# Patient Record
Sex: MALE | Race: WHITE | NOT HISPANIC OR LATINO | Employment: FULL TIME | URBAN - NONMETROPOLITAN AREA
[De-identification: names, ages, dates, MRNs, and addresses within clinical notes are randomized per-mention and may not be internally consistent; named-entity substitution may affect disease eponyms.]

---

## 2022-02-03 ENCOUNTER — OFFICE VISIT (OUTPATIENT)
Dept: CARDIOLOGY | Facility: CLINIC | Age: 62
End: 2022-02-03

## 2022-02-03 VITALS
BODY MASS INDEX: 41.88 KG/M2 | HEART RATE: 55 BPM | OXYGEN SATURATION: 99 % | HEIGHT: 72 IN | WEIGHT: 309.2 LBS | SYSTOLIC BLOOD PRESSURE: 140 MMHG | DIASTOLIC BLOOD PRESSURE: 69 MMHG

## 2022-02-03 DIAGNOSIS — I10 PRIMARY HYPERTENSION: ICD-10-CM

## 2022-02-03 DIAGNOSIS — R60.0 BILATERAL LEG EDEMA: ICD-10-CM

## 2022-02-03 DIAGNOSIS — R06.02 SOB (SHORTNESS OF BREATH): Primary | ICD-10-CM

## 2022-02-03 DIAGNOSIS — R53.82 CHRONIC FATIGUE: ICD-10-CM

## 2022-02-03 PROCEDURE — 93000 ELECTROCARDIOGRAM COMPLETE: CPT | Performed by: NURSE PRACTITIONER

## 2022-02-03 PROCEDURE — 99204 OFFICE O/P NEW MOD 45 MIN: CPT | Performed by: NURSE PRACTITIONER

## 2022-02-03 RX ORDER — LOPERAMIDE HYDROCHLORIDE 2 MG/1
2 CAPSULE ORAL AS NEEDED
COMMUNITY
End: 2022-03-24

## 2022-02-03 RX ORDER — ATENOLOL 50 MG/1
50 TABLET ORAL EVERY EVENING
COMMUNITY
Start: 2022-01-31

## 2022-02-03 RX ORDER — TORSEMIDE 20 MG/1
20 TABLET ORAL DAILY
Qty: 90 TABLET | Refills: 1 | Status: SHIPPED | OUTPATIENT
Start: 2022-02-03 | End: 2022-08-12 | Stop reason: SDUPTHER

## 2022-02-03 RX ORDER — BUMETANIDE 1 MG/1
1 TABLET ORAL 2 TIMES DAILY
COMMUNITY
Start: 2021-11-09 | End: 2022-02-03

## 2022-02-03 NOTE — PROGRESS NOTES
Subjective     Arley Wells is a 62 y.o. male who presents to day for Establish Care (presents for cardiac eval), Shortness of Breath, Edema, and Hypertension.    CHIEF COMPLIANT  Chief Complaint   Patient presents with   • Establish Care     presents for cardiac eval   • Shortness of Breath   • Edema   • Hypertension       Active Problems:  Problem List Items Addressed This Visit     None      Visit Diagnoses     SOB (shortness of breath)    -  Primary    Relevant Orders    Stress Test With Myocardial Perfusion One Day    Adult Transthoracic Echo Complete W/ Cont if Necessary Per Protocol    Doppler Ankle Brachial Index Single Level CAR    Bilateral leg edema        Relevant Medications    torsemide (Demadex) 20 MG tablet    Other Relevant Orders    Stress Test With Myocardial Perfusion One Day    Adult Transthoracic Echo Complete W/ Cont if Necessary Per Protocol    Doppler Ankle Brachial Index Single Level CAR    Chronic fatigue        Relevant Orders    Stress Test With Myocardial Perfusion One Day    Adult Transthoracic Echo Complete W/ Cont if Necessary Per Protocol    Doppler Ankle Brachial Index Single Level CAR    Primary hypertension        Relevant Medications    atenolol (TENORMIN) 50 MG tablet    torsemide (Demadex) 20 MG tablet    Other Relevant Orders    Stress Test With Myocardial Perfusion One Day    Adult Transthoracic Echo Complete W/ Cont if Necessary Per Protocol    Doppler Ankle Brachial Index Single Level CAR      Problem list  1.  Shortness of breath  2.  Lower extremity edema,  3.  Fatigue  4.  Hypertension    HPI  HPI  Mr. Wells is a 62-year-old male patient who is being seen today to establish care for shortness of breath and chronic hypertension.  Patient does have a history of shortness of breath that has been worsening over the last couple months.  Says it mainly occurs with activity and usually does not occur at rest.  He says that walking up stairs and moving too quickly does cause  him to become dyspneic.  He denies any orthopnea but does have occasional PND.  He reports waking up smothering at times.  He does have chronic arterial hypertension in which his blood pressure is mildly elevated today at 140/69.  He says routinely his blood pressure runs anywhere from 130-140/65-70.  He is currently on atenolol 50 mg daily for his chronic arterial hypertension.  He also reports worsening of lower extremity edema over the last couple months.  He reports that the Lasix has really helped a whole lot.  He says that the Bumex helped some more but he still has significant swelling.  He also reports chronic fatigue where he sleeps a lot and having worsening fatigue as well.  He says he wakes up swollen and he does notice any worsening of his edema, overnight.  He does report some pain with ambulation in his calf he also reports snoring, PND, witnessed episodes of apnea while sleeping and chronic fatigue.  He denies any chest pain, palpitations, dizziness, syncope, orthopnea, or strokelike symptoms.  PRIOR MEDS  Current Outpatient Medications on File Prior to Visit   Medication Sig Dispense Refill   • atenolol (TENORMIN) 50 MG tablet Take 50 mg by mouth Daily.     • loperamide (IMODIUM) 2 MG capsule Take 2 mg by mouth As Needed for Diarrhea.       No current facility-administered medications on file prior to visit.       ALLERGIES  Patient has no known allergies.    HISTORY  Past Medical History:   Diagnosis Date   • Hypertension        Social History     Socioeconomic History   • Marital status:    Tobacco Use   • Smoking status: Never Smoker   • Smokeless tobacco: Never Used   Substance and Sexual Activity   • Alcohol use: Never   • Drug use: Never       Family History   Problem Relation Age of Onset   • Hypertension Mother    • Hyperlipidemia Mother    • Heart attack Mother    • Stroke Mother    • No Known Problems Father    • Heart attack Maternal Grandmother    • Heart attack Maternal  "Grandfather        Review of Systems   Constitutional: Positive for fatigue. Negative for chills, diaphoresis and fever.   HENT: Negative.    Eyes: Negative.  Negative for visual disturbance.   Respiratory: Positive for cough (occas) and shortness of breath (with daily activity). Negative for apnea, chest tightness and wheezing.         Respiratory/sinus infection last week    Cardiovascular: Positive for leg swelling (BLE). Negative for chest pain and palpitations.   Gastrointestinal: Negative.  Negative for abdominal pain, blood in stool, constipation, diarrhea, nausea and vomiting.   Endocrine: Positive for cold intolerance.   Genitourinary: Negative.  Negative for hematuria.   Musculoskeletal: Positive for arthralgias, back pain, myalgias and neck pain.        Hip surgery planned for April   Skin: Negative.    Neurological: Positive for weakness and light-headedness (with quick movment). Negative for dizziness, syncope, numbness and headaches.   Hematological: Bruises/bleeds easily (bleeds easy).   Psychiatric/Behavioral: Negative.  Negative for sleep disturbance (+PND).       Objective     VITALS: /69 (BP Location: Left arm, Patient Position: Sitting)   Pulse 55   Ht 182.9 cm (72\")   Wt (!) 140 kg (309 lb 3.2 oz)   SpO2 99%   BMI 41.94 kg/m²     LABS:   Lab Results (most recent)     None          IMAGING:   No Images in the past 120 days found..    EXAM:  Physical Exam  Vitals and nursing note reviewed.   Constitutional:       Appearance: He is well-developed.   HENT:      Head: Normocephalic.   Eyes:      Pupils: Pupils are equal, round, and reactive to light.   Neck:      Thyroid: No thyroid mass.      Vascular: No carotid bruit or JVD.      Trachea: Trachea and phonation normal.   Cardiovascular:      Rate and Rhythm: Normal rate and regular rhythm.      Pulses:           Radial pulses are 2+ on the right side and 2+ on the left side.        Posterior tibial pulses are 2+ on the right side and 2+ " on the left side.      Heart sounds: Normal heart sounds. No murmur heard.  No friction rub. No gallop.    Pulmonary:      Effort: Pulmonary effort is normal. No respiratory distress.      Breath sounds: Normal breath sounds. No wheezing or rales.   Abdominal:      General: Bowel sounds are normal.      Palpations: Abdomen is soft.   Musculoskeletal:         General: Swelling (2+) present. Normal range of motion.      Cervical back: Neck supple.   Skin:     General: Skin is warm and dry.      Capillary Refill: Capillary refill takes less than 2 seconds.      Findings: No rash.   Neurological:      Mental Status: He is alert and oriented to person, place, and time.   Psychiatric:         Speech: Speech normal.         Behavior: Behavior normal.         Thought Content: Thought content normal.         Judgment: Judgment normal.         Procedure     ECG 12 Lead    Date/Time: 2/3/2022 1:39 PM  Performed by: Ej Wright APRN  Authorized by: Ej Wright APRN   Previous ECG: no previous ECG available  Rhythm: sinus rhythm and sinus bradycardia  Rate: bradycardic  BPM: 54  Conduction: non-specific intraventricular conduction delay  QRS axis: indeterminate  Other findings: non-specific ST-T wave changes  Comments:  ms  No acute changes               Assessment/Plan    Diagnosis Plan   1. SOB (shortness of breath)  Stress Test With Myocardial Perfusion One Day    Adult Transthoracic Echo Complete W/ Cont if Necessary Per Protocol    Doppler Ankle Brachial Index Single Level CAR   2. Bilateral leg edema  Stress Test With Myocardial Perfusion One Day    Adult Transthoracic Echo Complete W/ Cont if Necessary Per Protocol    torsemide (Demadex) 20 MG tablet    Doppler Ankle Brachial Index Single Level CAR   3. Chronic fatigue  Stress Test With Myocardial Perfusion One Day    Adult Transthoracic Echo Complete W/ Cont if Necessary Per Protocol    Doppler Ankle Brachial Index Single Level CAR   4. Primary  hypertension  Stress Test With Myocardial Perfusion One Day    Adult Transthoracic Echo Complete W/ Cont if Necessary Per Protocol    Doppler Ankle Brachial Index Single Level CAR   1.  Due to patient's worsening shortness of breath, nonspecific IVCD and nonspecific ST changes on EKG, and chronic arterial hypertension I do feel it is appropriate have patient undergo ischemic work-up including stress test and echocardiogram.  As well as his worsening edema which may be associated with worsening heart failure.  2.  Patient does have bilateral lower extremity edema and which I would like to get his echocardiogram performed for evaluation of diastolic and systolic function.  He has not responded well to Lasix a little bit better to Bumex but still has significant lower extremity edema's.  We will switch him to torsemide to see if he has more consistent diuresis and fluid stability.  3.  We did discuss the need for potential sleep study to rule out sleep apnea.  Patient will consider this and notify me if he wishes to proceed.  4.  Due to patient's claudication-like symptoms where he developed pain in his calf with ambulation I do feel it is appropriate to do ABIs for further evaluation of his peripheral vascular disease as well.  5.  Patient's blood pressure is controlled on current blood pressure medication regimen despite a mildly elevated blood pressure 140/69 today.  No medication changes are warranted at this time.  Patient advised to monitor blood pressure on a daily basis and report any persistent highs or lows.  Set goal blood pressure for patient at 130/80 or below.  6.  Informed of signs and symptoms of ACS and advised to seek emergent treatment for any new worsening symptoms.  Patient also advised sooner follow-up as needed.  Also advised to follow-up with family doctor as needed  This note is dictated utilizing voice recognition software.  Although this record has been proof read, transcriptional errors may  still be present. If questions occur regarding the content of this record please do not hesitate to call our office.  I have reviewed and confirmed the accuracy of the ROS as documented by the MA/LPN/RN JESSICA Quesada    Return in about 3 months (around 5/3/2022), or if symptoms worsen or fail to improve.    Diagnoses and all orders for this visit:    1. SOB (shortness of breath) (Primary)  -     Stress Test With Myocardial Perfusion One Day; Future  -     Adult Transthoracic Echo Complete W/ Cont if Necessary Per Protocol; Future  -     Doppler Ankle Brachial Index Single Level CAR; Future    2. Bilateral leg edema  -     Stress Test With Myocardial Perfusion One Day; Future  -     Adult Transthoracic Echo Complete W/ Cont if Necessary Per Protocol; Future  -     torsemide (Demadex) 20 MG tablet; Take 1 tablet by mouth Daily.  Dispense: 90 tablet; Refill: 1  -     Doppler Ankle Brachial Index Single Level CAR; Future    3. Chronic fatigue  -     Stress Test With Myocardial Perfusion One Day; Future  -     Adult Transthoracic Echo Complete W/ Cont if Necessary Per Protocol; Future  -     Doppler Ankle Brachial Index Single Level CAR; Future    4. Primary hypertension  -     Stress Test With Myocardial Perfusion One Day; Future  -     Adult Transthoracic Echo Complete W/ Cont if Necessary Per Protocol; Future  -     Doppler Ankle Brachial Index Single Level CAR; Future    Other orders  -     ECG 12 Lead        Advance Care Planning   ACP discussion was held with the patient during this visit. Patient does not have an advance directive, declines further assistance.       MEDS ORDERED DURING VISIT:  New Medications Ordered This Visit   Medications   • torsemide (Demadex) 20 MG tablet     Sig: Take 1 tablet by mouth Daily.     Dispense:  90 tablet     Refill:  1           This document has been electronically signed by JESSICA Quesada Jr.  February 15, 2022 10:13 EST

## 2022-03-14 ENCOUNTER — HOSPITAL ENCOUNTER (OUTPATIENT)
Dept: CARDIOLOGY | Facility: HOSPITAL | Age: 62
Discharge: HOME | End: 2022-03-14

## 2022-03-14 DIAGNOSIS — R06.02 SOB (SHORTNESS OF BREATH): ICD-10-CM

## 2022-03-14 DIAGNOSIS — R53.82 CHRONIC FATIGUE: ICD-10-CM

## 2022-03-14 DIAGNOSIS — I10 PRIMARY HYPERTENSION: ICD-10-CM

## 2022-03-14 DIAGNOSIS — R60.0 BILATERAL LEG EDEMA: ICD-10-CM

## 2022-03-14 PROCEDURE — 93017 CV STRESS TEST TRACING ONLY: CPT

## 2022-03-14 PROCEDURE — 0 TECHNETIUM SESTAMIBI: Performed by: INTERNAL MEDICINE

## 2022-03-14 PROCEDURE — 93018 CV STRESS TEST I&R ONLY: CPT | Performed by: INTERNAL MEDICINE

## 2022-03-14 PROCEDURE — A9500 TC99M SESTAMIBI: HCPCS | Performed by: INTERNAL MEDICINE

## 2022-03-14 PROCEDURE — 78452 HT MUSCLE IMAGE SPECT MULT: CPT | Performed by: INTERNAL MEDICINE

## 2022-03-14 PROCEDURE — 93306 TTE W/DOPPLER COMPLETE: CPT

## 2022-03-14 PROCEDURE — 78452 HT MUSCLE IMAGE SPECT MULT: CPT

## 2022-03-14 PROCEDURE — 93306 TTE W/DOPPLER COMPLETE: CPT | Performed by: INTERNAL MEDICINE

## 2022-03-14 RX ADMIN — TECHNETIUM TC 99M SESTAMIBI 1 DOSE: 1 INJECTION INTRAVENOUS at 14:51

## 2022-03-14 RX ADMIN — TECHNETIUM TC 99M SESTAMIBI 1 DOSE: 1 INJECTION INTRAVENOUS at 08:48

## 2022-03-15 LAB
BH CV REST NUCLEAR ISOTOPE DOSE: 10 MCI
BH CV STRESS NUCLEAR ISOTOPE DOSE: 30 MCI
BH CV STRESS RECOVERY BP: NORMAL MMHG
BH CV STRESS RECOVERY HR: 74 BPM
MAXIMAL PREDICTED HEART RATE: 159 BPM
PERCENT MAX PREDICTED HR: 72.96 %
STRESS BASELINE BP: NORMAL MMHG
STRESS BASELINE HR: 65 BPM
STRESS PERCENT HR: 86 %
STRESS POST ESTIMATED WORKLOAD: 6.9 METS
STRESS POST EXERCISE DUR MIN: 4 MIN
STRESS POST EXERCISE DUR SEC: 45 SEC
STRESS POST PEAK BP: NORMAL MMHG
STRESS POST PEAK HR: 116 BPM
STRESS TARGET HR: 135 BPM

## 2022-03-16 ENCOUNTER — TELEPHONE (OUTPATIENT)
Dept: CARDIOLOGY | Facility: CLINIC | Age: 62
End: 2022-03-16

## 2022-03-16 NOTE — TELEPHONE ENCOUNTER
Patient scheduled 3/24 at 10. Patient's wife aware. Char Castro MA      Patient informed of stress test results. He is not available for openings tomorrow, Friday or a day next week. He request appointment between 9-11. Advised I will call with date and time. Char Castro MA      ----- Message from JESSICA Quesada sent at 3/16/2022  6:16 AM EDT -----  Positive stress test.  1 week follow-up.  ----- Message -----  From: Jeffrey Pizano MD  Sent: 3/15/2022   8:42 PM EDT  To: JESSICA Quesada      Result Text  1.  Scintigraphic images demonstrate a large, mildly dense ischemic defect involving the anterior and septal walls.  There is a small moderately dense ischemic defect involving the apex.     2.  Preserved post-rest ejection fraction of 58% with septal and apical hypokinesis.     3.  No evidence of pharmacologically induced transient ischemic dilation or increased lung uptake of radiopharmaceutical.    Ej Wright APRN Cheek, Laura Anne, MA  Normal ABIs.  Keep follow-up.

## 2022-03-24 ENCOUNTER — OFFICE VISIT (OUTPATIENT)
Dept: CARDIOLOGY | Facility: CLINIC | Age: 62
End: 2022-03-24

## 2022-03-24 VITALS
WEIGHT: 307 LBS | OXYGEN SATURATION: 98 % | BODY MASS INDEX: 41.58 KG/M2 | DIASTOLIC BLOOD PRESSURE: 66 MMHG | HEIGHT: 72 IN | HEART RATE: 62 BPM | SYSTOLIC BLOOD PRESSURE: 150 MMHG

## 2022-03-24 DIAGNOSIS — R60.0 BILATERAL LEG EDEMA: ICD-10-CM

## 2022-03-24 DIAGNOSIS — E78.2 MIXED HYPERLIPIDEMIA: Primary | ICD-10-CM

## 2022-03-24 DIAGNOSIS — R53.82 CHRONIC FATIGUE: ICD-10-CM

## 2022-03-24 DIAGNOSIS — R06.02 SOB (SHORTNESS OF BREATH): ICD-10-CM

## 2022-03-24 DIAGNOSIS — I10 PRIMARY HYPERTENSION: ICD-10-CM

## 2022-03-24 DIAGNOSIS — R94.39 POSITIVE CARDIAC STRESS TEST: ICD-10-CM

## 2022-03-24 PROCEDURE — 99214 OFFICE O/P EST MOD 30 MIN: CPT | Performed by: NURSE PRACTITIONER

## 2022-03-24 RX ORDER — ROSUVASTATIN CALCIUM 20 MG/1
20 TABLET, COATED ORAL DAILY
Qty: 30 TABLET | Refills: 11 | Status: SHIPPED | OUTPATIENT
Start: 2022-03-24 | End: 2022-07-12

## 2022-03-24 NOTE — PROGRESS NOTES
Subjective     Arley Wells is a 62 y.o. male who presents to day for Shortness of Breath (Abnormal stress test, echo not read) and Hypertension.    CHIEF COMPLIANT  Chief Complaint   Patient presents with   • Shortness of Breath     Abnormal stress test, echo not read   • Hypertension       Active Problems:  Problem List Items Addressed This Visit    None     Visit Diagnoses     Mixed hyperlipidemia    -  Primary    Relevant Medications    rosuvastatin (CRESTOR) 20 MG tablet    SOB (shortness of breath)        Relevant Medications    rosuvastatin (CRESTOR) 20 MG tablet    Bilateral leg edema        Relevant Medications    rosuvastatin (CRESTOR) 20 MG tablet    Chronic fatigue        Relevant Medications    rosuvastatin (CRESTOR) 20 MG tablet    Primary hypertension        Relevant Medications    rosuvastatin (CRESTOR) 20 MG tablet    Positive cardiac stress test        Relevant Medications    rosuvastatin (CRESTOR) 20 MG tablet        Problem list  1.  Shortness of breath  1.1 stress test 3/22: Large, mildly dense ischemic defect involving the anterior and septal walls.  Small moderately dense ischemic defect involving the apex, EF 58%, septal and apical hypokinesis  2.  Lower extremity edema,  3.  Fatigue  4.  Hypertension    HPI  HPI  Mr. Wells is a 62-year-old male patient who is being followed up today for a positive nuclear stress test.  Patient did go under a stress test that identified a large, mildly dense ischemic defect involving the anterior and septal walls as well as a small moderately dense ischemic defect involving the apex.  He had a preserved post-strest ejection fraction of 58% with wall motion abnormalities of the septal and apical wall with hypokinesis.  Patient was evaluated with stress test due to significant shortness of breath and fatigue which has progressed over the recent past.  He reports that his shortness of breath is definitely gotten worse in the last couple months and mainly  occurs with activity but does not occur often at rest.  He says that walking up stairs and moving too quickly causes him to become dyspneic.  He really does not experience any orthopnea.  He does report occasional episodes of PND where he wakes up smothering.  He does have chronic arterial hypertension where his blood pressure is elevated today at 150/66 with a heart rate of 62.  He has been under a tremendous amount of stress with his mother.  He says it normally runs in the 130s over 70s.  He does report that his lower extremity edema has improved with the torsemide.  He still has 2-3+ lower extremity edema today.  He wakes up swollen and it never completely resolves.  He says is worse with ambulation.  He denies any chest pain, lower extremity HERBERT, palpitations, fatigue, dizziness, lightheadedness, syncope, PND, orthopnea, or strokelike symptoms.  We did review the stress test and his questions were answered.  PRIOR MEDS  Current Outpatient Medications on File Prior to Visit   Medication Sig Dispense Refill   • atenolol (TENORMIN) 50 MG tablet Take 50 mg by mouth Daily.     • torsemide (Demadex) 20 MG tablet Take 1 tablet by mouth Daily. 90 tablet 1   • [DISCONTINUED] loperamide (IMODIUM) 2 MG capsule Take 2 mg by mouth As Needed for Diarrhea.       No current facility-administered medications on file prior to visit.       ALLERGIES  Patient has no known allergies.    HISTORY  Past Medical History:   Diagnosis Date   • Hypertension        Social History     Socioeconomic History   • Marital status:    Tobacco Use   • Smoking status: Never Smoker   • Smokeless tobacco: Never Used   Substance and Sexual Activity   • Alcohol use: Never   • Drug use: Never       Family History   Problem Relation Age of Onset   • Hypertension Mother    • Hyperlipidemia Mother    • Heart attack Mother    • Stroke Mother    • No Known Problems Father    • Heart attack Maternal Grandmother    • Heart attack Maternal Grandfather   "      Review of Systems   Constitutional: Negative.  Negative for chills, diaphoresis, fatigue and fever.   HENT: Negative.    Eyes: Negative.  Negative for visual disturbance.   Respiratory: Positive for shortness of breath (with increased activity). Negative for apnea, cough, chest tightness and wheezing.    Cardiovascular: Negative.  Negative for chest pain, palpitations and leg swelling.   Gastrointestinal: Negative.  Negative for abdominal pain, blood in stool, constipation, diarrhea, nausea and vomiting.   Endocrine: Negative.    Genitourinary: Negative.  Negative for hematuria.   Musculoskeletal: Positive for arthralgias. Negative for back pain, myalgias and neck pain.   Skin: Negative.    Allergic/Immunologic: Negative.    Neurological: Negative.  Negative for dizziness, syncope, weakness, light-headedness, numbness and headaches.   Hematological: Negative.  Does not bruise/bleed easily.   Psychiatric/Behavioral: Negative.  Negative for sleep disturbance.       Objective     VITALS: /66 (BP Location: Left arm, Patient Position: Sitting)   Pulse 62   Ht 182.9 cm (72.01\")   Wt (!) 139 kg (307 lb)   SpO2 98%   BMI 41.63 kg/m²     LABS:   Lab Results (most recent)      None          IMAGING:   No Images in the past 120 days found..    EXAM:  Physical Exam  Vitals and nursing note reviewed.   Constitutional:       Appearance: He is well-developed.   HENT:      Head: Normocephalic.   Eyes:      Pupils: Pupils are equal, round, and reactive to light.   Neck:      Thyroid: No thyroid mass.      Vascular: No carotid bruit or JVD.      Trachea: Trachea and phonation normal.   Cardiovascular:      Rate and Rhythm: Normal rate and regular rhythm.      Pulses:           Radial pulses are 2+ on the right side and 2+ on the left side.        Posterior tibial pulses are 2+ on the right side and 2+ on the left side.      Heart sounds: Murmur heard.     No friction rub. No gallop.   Pulmonary:      Effort: " Pulmonary effort is normal. No respiratory distress.      Breath sounds: Normal breath sounds. No wheezing or rales.   Abdominal:      General: Bowel sounds are normal.      Palpations: Abdomen is soft.   Musculoskeletal:         General: Swelling (2-3+) present. Normal range of motion.      Cervical back: Neck supple.   Skin:     General: Skin is warm and dry.      Capillary Refill: Capillary refill takes less than 2 seconds.      Findings: No rash.   Neurological:      Mental Status: He is alert and oriented to person, place, and time.   Psychiatric:         Speech: Speech normal.         Behavior: Behavior normal.         Thought Content: Thought content normal.         Judgment: Judgment normal.         Procedure   Procedures       Assessment/Plan    Diagnosis Plan   1. Mixed hyperlipidemia  rosuvastatin (CRESTOR) 20 MG tablet   2. SOB (shortness of breath)  rosuvastatin (CRESTOR) 20 MG tablet   3. Bilateral leg edema  rosuvastatin (CRESTOR) 20 MG tablet   4. Chronic fatigue  rosuvastatin (CRESTOR) 20 MG tablet   5. Primary hypertension  rosuvastatin (CRESTOR) 20 MG tablet   6. Positive cardiac stress test  rosuvastatin (CRESTOR) 20 MG tablet   1.  Patient did have a positive stress test that showed a large anterior and septal ischemia with a small apical ischemic defect.  It is felt as if his increasing fatigue and shortness of breath were ischemic equivalent as well of his failure-like symptoms including significant lower extremity edema 2-3+ bilaterally with torsemide therapy.  Therefore I would like to have patient move forth with left heart catheterization.  We also discussed the possibility of a CTA as a less invasive route.  He wishes to consider these options and pray about it before moving forth.  We did discuss the benefits and risk of both procedures.  He will notify me as soon as he determines which route he would like to pursue.  2.  Due to patient's hyperlipidemia with an LDL cholesterol 122 as  well as the likelihood of coronary artery disease I would like to have him start antiplatelet therapy of rosuvastatin 20 mg daily.  3.  At this time we will hold aspirin therapy due to the fact he has a platelet count of 88,000.  4.  Patient's blood pressure is elevated which seems to be situational in nature.  He will continue to monitor his blood pressure report any significant highs or lows.  5.  Informed of signs and symptoms of ACS and advised to seek emergent treatment for any new worsening symptoms.  Patient also advised sooner follow-up as needed.  Also advised to follow-up with family doctor as needed  This note is dictated utilizing voice recognition software.  Although this record has been proof read, transcriptional errors may still be present. If questions occur regarding the content of this record please do not hesitate to call our office.  I have reviewed and confirmed the accuracy of the ROS as documented by the MA/LPN/RN JESSICA Quesada    Return in about 3 months (around 6/24/2022), or if symptoms worsen or fail to improve.    Diagnoses and all orders for this visit:    1. Mixed hyperlipidemia (Primary)  -     rosuvastatin (CRESTOR) 20 MG tablet; Take 1 tablet by mouth Daily.  Dispense: 30 tablet; Refill: 11    2. SOB (shortness of breath)  -     rosuvastatin (CRESTOR) 20 MG tablet; Take 1 tablet by mouth Daily.  Dispense: 30 tablet; Refill: 11    3. Bilateral leg edema  -     rosuvastatin (CRESTOR) 20 MG tablet; Take 1 tablet by mouth Daily.  Dispense: 30 tablet; Refill: 11    4. Chronic fatigue  -     rosuvastatin (CRESTOR) 20 MG tablet; Take 1 tablet by mouth Daily.  Dispense: 30 tablet; Refill: 11    5. Primary hypertension  -     rosuvastatin (CRESTOR) 20 MG tablet; Take 1 tablet by mouth Daily.  Dispense: 30 tablet; Refill: 11    6. Positive cardiac stress test  -     rosuvastatin (CRESTOR) 20 MG tablet; Take 1 tablet by mouth Daily.  Dispense: 30 tablet; Refill: 11                    MEDS ORDERED DURING VISIT:  New Medications Ordered This Visit   Medications   • rosuvastatin (CRESTOR) 20 MG tablet     Sig: Take 1 tablet by mouth Daily.     Dispense:  30 tablet     Refill:  11           This document has been electronically signed by Ej Wright Jr., APRN  March 24, 2022 10:49 EDT

## 2022-03-27 LAB
BH CV ECHO MEAS - ACS: 2.5 CM
BH CV ECHO MEAS - AO MAX PG: 15.7 MMHG
BH CV ECHO MEAS - AO MEAN PG: 8 MMHG
BH CV ECHO MEAS - AO ROOT AREA (BSA CORRECTED): 1.3
BH CV ECHO MEAS - AO ROOT AREA: 8.8 CM^2
BH CV ECHO MEAS - AO ROOT DIAM: 3.4 CM
BH CV ECHO MEAS - AO V2 MAX: 198 CM/SEC
BH CV ECHO MEAS - AO V2 MEAN: 131 CM/SEC
BH CV ECHO MEAS - AO V2 VTI: 46.1 CM
BH CV ECHO MEAS - BSA(HAYCOCK): 2.7 M^2
BH CV ECHO MEAS - BSA: 2.6 M^2
BH CV ECHO MEAS - BZI_BMI: 41.9 KILOGRAMS/M^2
BH CV ECHO MEAS - BZI_METRIC_HEIGHT: 182.9 CM
BH CV ECHO MEAS - BZI_METRIC_WEIGHT: 140.2 KG
BH CV ECHO MEAS - EDV(CUBED): 143.1 ML
BH CV ECHO MEAS - EDV(MOD-SP4): 160 ML
BH CV ECHO MEAS - EDV(TEICH): 131.2 ML
BH CV ECHO MEAS - EF(CUBED): 72.8 %
BH CV ECHO MEAS - EF(MOD-SP4): 63.1 %
BH CV ECHO MEAS - EF(TEICH): 64.1 %
BH CV ECHO MEAS - ESV(CUBED): 39 ML
BH CV ECHO MEAS - ESV(MOD-SP4): 59 ML
BH CV ECHO MEAS - ESV(TEICH): 47.1 ML
BH CV ECHO MEAS - FS: 35.2 %
BH CV ECHO MEAS - IVS/LVPW: 0.94
BH CV ECHO MEAS - IVSD: 1.2 CM
BH CV ECHO MEAS - LA DIMENSION: 4.1 CM
BH CV ECHO MEAS - LA/AO: 1.2
BH CV ECHO MEAS - LV DIASTOLIC VOL/BSA (35-75): 62.4 ML/M^2
BH CV ECHO MEAS - LV IVRT: 0.12 SEC
BH CV ECHO MEAS - LV MASS(C)D: 251.1 GRAMS
BH CV ECHO MEAS - LV MASS(C)DI: 98 GRAMS/M^2
BH CV ECHO MEAS - LV SYSTOLIC VOL/BSA (12-30): 23 ML/M^2
BH CV ECHO MEAS - LVIDD: 5.2 CM
BH CV ECHO MEAS - LVIDS: 3.4 CM
BH CV ECHO MEAS - LVLD AP4: 9.7 CM
BH CV ECHO MEAS - LVLS AP4: 7.6 CM
BH CV ECHO MEAS - LVOT AREA (M): 3.8 CM^2
BH CV ECHO MEAS - LVOT AREA: 3.8 CM^2
BH CV ECHO MEAS - LVOT DIAM: 2.2 CM
BH CV ECHO MEAS - LVPWD: 1.2 CM
BH CV ECHO MEAS - MV A MAX VEL: 75 CM/SEC
BH CV ECHO MEAS - MV DEC SLOPE: 298 CM/SEC^2
BH CV ECHO MEAS - MV E MAX VEL: 89.1 CM/SEC
BH CV ECHO MEAS - MV E/A: 1.2
BH CV ECHO MEAS - RVDD: 2.2 CM
BH CV ECHO MEAS - SI(AO): 158.5 ML/M^2
BH CV ECHO MEAS - SI(CUBED): 40.6 ML/M^2
BH CV ECHO MEAS - SI(MOD-SP4): 39.4 ML/M^2
BH CV ECHO MEAS - SI(TEICH): 32.8 ML/M^2
BH CV ECHO MEAS - SV(AO): 406.3 ML
BH CV ECHO MEAS - SV(CUBED): 104.1 ML
BH CV ECHO MEAS - SV(MOD-SP4): 101 ML
BH CV ECHO MEAS - SV(TEICH): 84.1 ML
MAXIMAL PREDICTED HEART RATE: 159 BPM
STRESS TARGET HR: 135 BPM

## 2022-03-29 ENCOUNTER — TELEPHONE (OUTPATIENT)
Dept: CARDIOLOGY | Facility: CLINIC | Age: 62
End: 2022-03-29

## 2022-03-29 NOTE — PROGRESS NOTES
Avsd pt no acute findings on echo and to keep his routine follow up visit. Patient voiced understanding.

## 2022-03-29 NOTE — TELEPHONE ENCOUNTER
Avsd pt no acute findings on echo and to keep his routine follow up visit. Patient voiced understanding.    ----- Message from Nadeem Moe sent at 3/28/2022  5:28 PM EDT -----    ----- Message -----  From: Ej Wright APRN  Sent: 3/28/2022   9:46 AM EDT  To: Char Castro MA    There is no acute findings on the echocardiogram.  Keep follow-up.  ----- Message -----  From: Jeffrey Pizano MD  Sent: 3/27/2022   4:13 PM EDT  To: JESSICA Quesada

## 2022-07-12 ENCOUNTER — OFFICE VISIT (OUTPATIENT)
Dept: CARDIOLOGY | Facility: CLINIC | Age: 62
End: 2022-07-12

## 2022-07-12 ENCOUNTER — LAB (OUTPATIENT)
Dept: CARDIOLOGY | Facility: CLINIC | Age: 62
End: 2022-07-12

## 2022-07-12 VITALS
HEIGHT: 72 IN | BODY MASS INDEX: 41.23 KG/M2 | WEIGHT: 304.4 LBS | OXYGEN SATURATION: 90 % | DIASTOLIC BLOOD PRESSURE: 69 MMHG | HEART RATE: 50 BPM | SYSTOLIC BLOOD PRESSURE: 136 MMHG

## 2022-07-12 DIAGNOSIS — R94.39 POSITIVE CARDIAC STRESS TEST: ICD-10-CM

## 2022-07-12 DIAGNOSIS — I10 PRIMARY HYPERTENSION: ICD-10-CM

## 2022-07-12 DIAGNOSIS — R06.02 SOB (SHORTNESS OF BREATH): ICD-10-CM

## 2022-07-12 DIAGNOSIS — R60.0 BILATERAL LEG EDEMA: ICD-10-CM

## 2022-07-12 DIAGNOSIS — R06.02 SOB (SHORTNESS OF BREATH): Primary | ICD-10-CM

## 2022-07-12 LAB
ALBUMIN SERPL-MCNC: 3.04 G/DL (ref 3.5–5.2)
ALBUMIN/GLOB SERPL: 1.1 G/DL
ALP SERPL-CCNC: 187 U/L (ref 39–117)
ALT SERPL W P-5'-P-CCNC: 43 U/L (ref 1–41)
ANION GAP SERPL CALCULATED.3IONS-SCNC: 10.7 MMOL/L (ref 5–15)
AST SERPL-CCNC: 65 U/L (ref 1–40)
BASOPHILS # BLD AUTO: 0.06 10*3/MM3 (ref 0–0.2)
BASOPHILS NFR BLD AUTO: 1.2 % (ref 0–1.5)
BILIRUB SERPL-MCNC: 4.1 MG/DL (ref 0–1.2)
BUN SERPL-MCNC: 10 MG/DL (ref 8–23)
BUN/CREAT SERPL: 9.9 (ref 7–25)
CALCIUM SPEC-SCNC: 8.7 MG/DL (ref 8.6–10.5)
CHLORIDE SERPL-SCNC: 106 MMOL/L (ref 98–107)
CO2 SERPL-SCNC: 23.3 MMOL/L (ref 22–29)
CREAT SERPL-MCNC: 1.01 MG/DL (ref 0.76–1.27)
DEPRECATED RDW RBC AUTO: 53.2 FL (ref 37–54)
EGFRCR SERPLBLD CKD-EPI 2021: 84.6 ML/MIN/1.73
EOSINOPHIL # BLD AUTO: 0.24 10*3/MM3 (ref 0–0.4)
EOSINOPHIL NFR BLD AUTO: 4.8 % (ref 0.3–6.2)
ERYTHROCYTE [DISTWIDTH] IN BLOOD BY AUTOMATED COUNT: 15 % (ref 12.3–15.4)
GLOBULIN UR ELPH-MCNC: 2.8 GM/DL
GLUCOSE SERPL-MCNC: 94 MG/DL (ref 65–99)
HCT VFR BLD AUTO: 35.4 % (ref 37.5–51)
HGB BLD-MCNC: 11.9 G/DL (ref 13–17.7)
IMM GRANULOCYTES # BLD AUTO: 0.02 10*3/MM3 (ref 0–0.05)
IMM GRANULOCYTES NFR BLD AUTO: 0.4 % (ref 0–0.5)
LYMPHOCYTES # BLD AUTO: 1.89 10*3/MM3 (ref 0.7–3.1)
LYMPHOCYTES NFR BLD AUTO: 37.6 % (ref 19.6–45.3)
MAGNESIUM SERPL-MCNC: 2.2 MG/DL (ref 1.6–2.4)
MCH RBC QN AUTO: 32.3 PG (ref 26.6–33)
MCHC RBC AUTO-ENTMCNC: 33.6 G/DL (ref 31.5–35.7)
MCV RBC AUTO: 96.2 FL (ref 79–97)
MONOCYTES # BLD AUTO: 0.52 10*3/MM3 (ref 0.1–0.9)
MONOCYTES NFR BLD AUTO: 10.4 % (ref 5–12)
NEUTROPHILS NFR BLD AUTO: 2.29 10*3/MM3 (ref 1.7–7)
NEUTROPHILS NFR BLD AUTO: 45.6 % (ref 42.7–76)
NRBC BLD AUTO-RTO: 0 /100 WBC (ref 0–0.2)
PLATELET # BLD AUTO: 74 10*3/MM3 (ref 140–450)
PMV BLD AUTO: 10 FL (ref 6–12)
POTASSIUM SERPL-SCNC: 3.7 MMOL/L (ref 3.5–5.2)
PROT SERPL-MCNC: 5.8 G/DL (ref 6–8.5)
RBC # BLD AUTO: 3.68 10*6/MM3 (ref 4.14–5.8)
SODIUM SERPL-SCNC: 140 MMOL/L (ref 136–145)
TSH SERPL DL<=0.05 MIU/L-ACNC: 5.05 UIU/ML (ref 0.27–4.2)
WBC NRBC COR # BLD: 5.02 10*3/MM3 (ref 3.4–10.8)

## 2022-07-12 PROCEDURE — 83735 ASSAY OF MAGNESIUM: CPT | Performed by: NURSE PRACTITIONER

## 2022-07-12 PROCEDURE — 99214 OFFICE O/P EST MOD 30 MIN: CPT | Performed by: NURSE PRACTITIONER

## 2022-07-12 PROCEDURE — 80050 GENERAL HEALTH PANEL: CPT | Performed by: NURSE PRACTITIONER

## 2022-07-12 PROCEDURE — 36415 COLL VENOUS BLD VENIPUNCTURE: CPT

## 2022-07-12 RX ORDER — POTASSIUM CHLORIDE 750 MG/1
10 TABLET, FILM COATED, EXTENDED RELEASE ORAL DAILY
Qty: 30 TABLET | Refills: 11 | Status: SHIPPED | OUTPATIENT
Start: 2022-07-12

## 2022-07-12 NOTE — PROGRESS NOTES
Subjective     Arley Wells is a 62 y.o. male who presents to day for 3 month follow up  (Stress Echo OLE), Hypertension, and Edema (Both legs Left  worse than right).    CHIEF COMPLIANT  Chief Complaint   Patient presents with   • 3 month follow up      Stress Echo OLE   • Hypertension   • Edema     Both legs Left  worse than right       Active Problems:  Problem List Items Addressed This Visit        Pulmonary and Pneumonias    SOB (shortness of breath) - Primary    Overview     · Echo (3/14/22): LVEF >50%.  No significant valvular abnormality         Relevant Orders    Comprehensive Metabolic Panel (Completed)    CBC & Differential (Completed)    TSH (Completed)    Magnesium (Completed)    Case Request Cath Lab: within 1-2 weeks for surgical clearence   (Completed)      Other Visit Diagnoses     Bilateral leg edema        Relevant Orders    Comprehensive Metabolic Panel (Completed)    CBC & Differential (Completed)    TSH (Completed)    Magnesium (Completed)    Case Request Cath Lab: within 1-2 weeks for surgical clearence   (Completed)    Positive cardiac stress test        Relevant Orders    Comprehensive Metabolic Panel (Completed)    CBC & Differential (Completed)    TSH (Completed)    Magnesium (Completed)    Case Request Cath Lab: within 1-2 weeks for surgical clearence   (Completed)    Primary hypertension        Relevant Orders    Comprehensive Metabolic Panel (Completed)    CBC & Differential (Completed)    TSH (Completed)    Magnesium (Completed)    Case Request Cath Lab: within 1-2 weeks for surgical clearence   (Completed)      Problem list  1.  Shortness of breath  1.1 stress test 3/22: Large, mildly dense ischemic defect involving the anterior and septal walls.  Small moderately dense ischemic defect involving the apex, EF 58%, septal and apical hypokinesis  1.2 left heart catheterization 7/22: Mild to moderate diffuse coronary artery disease, diffuse moderate coronary artery disease of the mid to  distal LAD, EF 50%, normal LV filling pressures LVEDP 6  2.  Lower extremity edema,  3.  Fatigue  4.  Hypertension    HPI  HPI  Mr. Wells is a 62-year-old male patient who is being followed up today for positive stress test that ended dated a large mildly dense ischemic defect involving the anterior and septal walls.  Small moderately dense ischemic defect involving the apex with a preserved post-rest ejection fraction of 58% with septal and apical hypokinesia.  Patient does have symptoms of shortness of breath and fatigue which has progressed over the recent past.  He reports that his shortness of breath is definitely gotten worse over the last several months and mainly occurs with activity and rarely with rest.  He reports that minimal activity causes him to become dyspneic.  He says walking up stairs or moving too quickly are some of the key triggers that causes him to become dyspneic.  His echocardiogram identified an EF greater than 50% with no significant valvular disease and grade 1A diastolic dysfunction.  He also has chronic lower extremity edema with the right left being worse than the right.  He said that more or less this comes and goes.  He says they are not always swollen but they do worsen throughout the day especially if he is on his feet for prolonged periods of time.  He does have a history of chronic arterial hypertension which his blood pressure is controlled today 136/69 heart rate of 50.  He currently denies any chest pain, palpitations, dizziness, lightheadedness, syncope, or strokelike symptoms.            PRIOR MEDS  Current Outpatient Medications on File Prior to Visit   Medication Sig Dispense Refill   • atenolol (TENORMIN) 50 MG tablet Take 50 mg by mouth Every Evening.     • torsemide (Demadex) 20 MG tablet Take 1 tablet by mouth Daily. 90 tablet 1     No current facility-administered medications on file prior to visit.       ALLERGIES  Patient has no known allergies.    HISTORY  Past  "Medical History:   Diagnosis Date   • Edema    • Hypertension        Social History     Socioeconomic History   • Marital status:    Tobacco Use   • Smoking status: Never Smoker   • Smokeless tobacco: Never Used   Substance and Sexual Activity   • Alcohol use: Never   • Drug use: Never   • Sexual activity: Defer       Family History   Problem Relation Age of Onset   • Hypertension Mother    • Hyperlipidemia Mother    • Heart attack Mother    • Stroke Mother    • No Known Problems Father    • Heart attack Maternal Grandmother    • Heart attack Maternal Grandfather        Review of Systems   Constitutional: Positive for fatigue. Negative for chills and fever.   HENT: Negative for congestion, rhinorrhea and sore throat.    Respiratory: Positive for shortness of breath. Negative for chest tightness.    Cardiovascular: Positive for leg swelling (Left worse than right). Negative for chest pain and palpitations.   Gastrointestinal: Negative for constipation, diarrhea and nausea.   Musculoskeletal: Positive for arthralgias. Negative for back pain and neck pain.   Allergic/Immunologic: Positive for environmental allergies. Negative for food allergies.   Neurological: Negative for dizziness, syncope, weakness and light-headedness.   Hematological: Bruises/bleeds easily (bleed).   Psychiatric/Behavioral: Negative for sleep disturbance.       Objective     VITALS: /69 (BP Location: Left arm, Patient Position: Sitting)   Pulse 50   Ht 182.9 cm (72.01\")   Wt (!) 138 kg (304 lb 6.4 oz)   SpO2 90%   BMI 41.27 kg/m²     LABS:   Lab Results (most recent)     None          IMAGING:   No Images in the past 120 days found..    EXAM:  Physical Exam  Vitals and nursing note reviewed.   Constitutional:       Appearance: He is well-developed.   HENT:      Head: Normocephalic.   Eyes:      Pupils: Pupils are equal, round, and reactive to light.   Neck:      Thyroid: No thyroid mass.      Vascular: No carotid bruit or JVD. "      Trachea: Trachea and phonation normal.   Cardiovascular:      Rate and Rhythm: Normal rate and regular rhythm.      Pulses:           Radial pulses are 2+ on the right side and 2+ on the left side.        Posterior tibial pulses are 2+ on the right side and 2+ on the left side.      Heart sounds: Murmur heard.     No friction rub. No gallop.   Pulmonary:      Effort: Pulmonary effort is normal. No respiratory distress.      Breath sounds: Normal breath sounds. No wheezing or rales.   Abdominal:      General: Bowel sounds are normal.      Palpations: Abdomen is soft.   Musculoskeletal:         General: Swelling (2-3+) present. Normal range of motion.      Cervical back: Neck supple.   Skin:     General: Skin is warm and dry.      Capillary Refill: Capillary refill takes less than 2 seconds.      Findings: No rash.   Neurological:      Mental Status: He is alert and oriented to person, place, and time.   Psychiatric:         Speech: Speech normal.         Behavior: Behavior normal.         Thought Content: Thought content normal.         Judgment: Judgment normal.         Procedure   Procedures       Assessment & Plan    Diagnosis Plan   1. SOB (shortness of breath)  Comprehensive Metabolic Panel    CBC & Differential    TSH    Magnesium    Case Request Cath Lab: within 1-2 weeks for surgical clearence     2. Bilateral leg edema  Comprehensive Metabolic Panel    CBC & Differential    TSH    Magnesium    Case Request Cath Lab: within 1-2 weeks for surgical clearence     3. Positive cardiac stress test  Comprehensive Metabolic Panel    CBC & Differential    TSH    Magnesium    Case Request Cath Lab: within 1-2 weeks for surgical clearence     4. Primary hypertension  Comprehensive Metabolic Panel    CBC & Differential    TSH    Magnesium    Case Request Cath Lab: within 1-2 weeks for surgical clearence     1.  Due to patient's positive stress test significant shortness of breath and worsening fatigue I do think  that it is appropriate have patient go under left heart catheterization for further evaluation.  This will also help us further risk stratify him for his upcoming surgery.  Patient has opted to move forth with a left heart catheterization.  Benefits and risk of the heart catheterization was explained and patient wishes to continue.    We will also have patient have labs drawn proximately 1 week prior to left heart catheterization for further risk stratification  2.  Patient's blood pressure is controlled on current blood pressure medication regimen.  No medication changes are warranted at this time.  Patient advised to monitor blood pressure on a daily basis and report any persistent highs or lows.  Set goal blood pressure for patient at 130/80 or below.  3.  Informed of signs and symptoms of ACS and advised to seek emergent treatment for any new worsening symptoms.  Patient also advised sooner follow-up as needed.  Also advised to follow-up with family doctor as needed  This note is dictated utilizing voice recognition software.  Although this record has been proof read, transcriptional errors may still be present. If questions occur regarding the content of this record please do not hesitate to call our office.  I have reviewed and confirmed the accuracy of the ROS as documented by the MA/LPN/RN JESSICA Quesada  No follow-ups on file.    Diagnoses and all orders for this visit:    1. SOB (shortness of breath) (Primary)  -     Comprehensive Metabolic Panel; Future  -     CBC & Differential; Future  -     TSH; Future  -     Magnesium; Future  -     Case Request Cath Lab: within 1-2 weeks for surgical clearence      2. Bilateral leg edema  -     Comprehensive Metabolic Panel; Future  -     CBC & Differential; Future  -     TSH; Future  -     Magnesium; Future  -     Case Request Cath Lab: within 1-2 weeks for surgical clearence      3. Positive cardiac stress test  -     Comprehensive Metabolic Panel; Future  -      CBC & Differential; Future  -     TSH; Future  -     Magnesium; Future  -     Case Request Cath Lab: within 1-2 weeks for surgical clearence      4. Primary hypertension  -     Comprehensive Metabolic Panel; Future  -     CBC & Differential; Future  -     TSH; Future  -     Magnesium; Future  -     Case Request Cath Lab: within 1-2 weeks for surgical clearence      Other orders  -     potassium chloride 10 MEQ CR tablet; Take 1 tablet by mouth Daily.  Dispense: 30 tablet; Refill: 11        Arley Wells  reports that he has never smoked. He has never used smokeless tobacco.. I have educated him on the risk of diseases from using tobacco products .       MEDS ORDERED DURING VISIT:  New Medications Ordered This Visit   Medications   • potassium chloride 10 MEQ CR tablet     Sig: Take 1 tablet by mouth Daily.     Dispense:  30 tablet     Refill:  11           This document has been electronically signed by Ej Wright Jr., APRTERRI  July 27, 2022 23:58 EDT

## 2022-07-13 ENCOUNTER — PREP FOR SURGERY (OUTPATIENT)
Dept: OTHER | Facility: HOSPITAL | Age: 62
End: 2022-07-13

## 2022-07-13 ENCOUNTER — TELEPHONE (OUTPATIENT)
Dept: CARDIOLOGY | Facility: CLINIC | Age: 62
End: 2022-07-13

## 2022-07-13 DIAGNOSIS — R06.09 DYSPNEA ON EXERTION: Primary | ICD-10-CM

## 2022-07-13 PROBLEM — I10 PRIMARY HYPERTENSION: Status: ACTIVE | Noted: 2022-07-13

## 2022-07-13 PROBLEM — R94.39 POSITIVE CARDIAC STRESS TEST: Status: ACTIVE | Noted: 2022-07-13

## 2022-07-13 PROBLEM — R06.02 SOB (SHORTNESS OF BREATH): Status: ACTIVE | Noted: 2022-07-13

## 2022-07-13 PROBLEM — R60.0 BILATERAL LEG EDEMA: Status: ACTIVE | Noted: 2022-07-13

## 2022-07-13 RX ORDER — ASPIRIN 81 MG/1
324 TABLET, CHEWABLE ORAL ONCE
Status: CANCELLED | OUTPATIENT
Start: 2022-07-13 | End: 2022-07-13

## 2022-07-13 RX ORDER — SODIUM CHLORIDE 0.9 % (FLUSH) 0.9 %
10 SYRINGE (ML) INJECTION EVERY 12 HOURS SCHEDULED
Status: CANCELLED | OUTPATIENT
Start: 2022-07-13

## 2022-07-13 RX ORDER — NITROGLYCERIN 0.4 MG/1
0.4 TABLET SUBLINGUAL
Status: CANCELLED | OUTPATIENT
Start: 2022-07-13

## 2022-07-13 RX ORDER — SODIUM CHLORIDE 0.9 % (FLUSH) 0.9 %
10 SYRINGE (ML) INJECTION AS NEEDED
Status: CANCELLED | OUTPATIENT
Start: 2022-07-13

## 2022-07-13 RX ORDER — ACETAMINOPHEN 325 MG/1
650 TABLET ORAL EVERY 4 HOURS PRN
Status: CANCELLED | OUTPATIENT
Start: 2022-07-13

## 2022-07-13 RX ORDER — ASPIRIN 81 MG/1
81 TABLET ORAL DAILY
Status: CANCELLED | OUTPATIENT
Start: 2022-07-14

## 2022-07-13 NOTE — TELEPHONE ENCOUNTER
Ej Wright, Brandie Holguin MA  Patient's thyroid is at 5.05 which is mildly elevated as well as mild elevation of both AST and ALT.  Please forward these labs to his PCP for further evaluation.  Otherwise his labs are relatively within normal limits.      Called patient advised of above lab results. I will forward to Dr Villalta.    Kassy RIZO

## 2022-07-25 ENCOUNTER — HOSPITAL ENCOUNTER (OUTPATIENT)
Facility: HOSPITAL | Age: 62
Setting detail: HOSPITAL OUTPATIENT SURGERY
Discharge: HOME | End: 2022-07-25
Attending: INTERNAL MEDICINE | Admitting: NURSE PRACTITIONER

## 2022-07-25 VITALS
RESPIRATION RATE: 20 BRPM | SYSTOLIC BLOOD PRESSURE: 137 MMHG | HEART RATE: 56 BPM | TEMPERATURE: 98 F | WEIGHT: 302.47 LBS | DIASTOLIC BLOOD PRESSURE: 69 MMHG | OXYGEN SATURATION: 98 % | HEIGHT: 72 IN | BODY MASS INDEX: 40.97 KG/M2

## 2022-07-25 DIAGNOSIS — R94.39 POSITIVE CARDIAC STRESS TEST: ICD-10-CM

## 2022-07-25 DIAGNOSIS — E78.5 HYPERLIPIDEMIA LDL GOAL <70: Primary | ICD-10-CM

## 2022-07-25 DIAGNOSIS — R60.0 BILATERAL LEG EDEMA: ICD-10-CM

## 2022-07-25 DIAGNOSIS — R06.02 SOB (SHORTNESS OF BREATH): ICD-10-CM

## 2022-07-25 DIAGNOSIS — R06.09 DYSPNEA ON EXERTION: ICD-10-CM

## 2022-07-25 DIAGNOSIS — I10 PRIMARY HYPERTENSION: ICD-10-CM

## 2022-07-25 PROBLEM — I25.119 CORONARY ARTERY DISEASE INVOLVING NATIVE CORONARY ARTERY OF NATIVE HEART WITH ANGINA PECTORIS: Status: ACTIVE | Noted: 2022-07-13

## 2022-07-25 LAB
ALBUMIN SERPL-MCNC: 2.9 G/DL (ref 3.5–5.2)
ALBUMIN/GLOB SERPL: 1 G/DL
ALP SERPL-CCNC: 204 U/L (ref 39–117)
ALT SERPL W P-5'-P-CCNC: 36 U/L (ref 1–41)
ANION GAP SERPL CALCULATED.3IONS-SCNC: 14 MMOL/L (ref 5–15)
AST SERPL-CCNC: 65 U/L (ref 1–40)
BASOPHILS # BLD AUTO: 0.06 10*3/MM3 (ref 0–0.2)
BASOPHILS NFR BLD AUTO: 1.2 % (ref 0–1.5)
BILIRUB SERPL-MCNC: 4.1 MG/DL (ref 0–1.2)
BUN SERPL-MCNC: 7 MG/DL (ref 8–23)
BUN/CREAT SERPL: 8.2 (ref 7–25)
CALCIUM SPEC-SCNC: 8.9 MG/DL (ref 8.6–10.5)
CHLORIDE SERPL-SCNC: 106 MMOL/L (ref 98–107)
CHOLEST SERPL-MCNC: 187 MG/DL (ref 0–200)
CO2 SERPL-SCNC: 24 MMOL/L (ref 22–29)
CREAT BLDA-MCNC: 0.8 MG/DL (ref 0.6–1.3)
CREAT SERPL-MCNC: 0.85 MG/DL (ref 0.76–1.27)
CREAT UR-MCNC: 63.1 MG/DL
DEPRECATED RDW RBC AUTO: 52.9 FL (ref 37–54)
EGFRCR SERPLBLD CKD-EPI 2021: 98.9 ML/MIN/1.73
EOSINOPHIL # BLD AUTO: 0.23 10*3/MM3 (ref 0–0.4)
EOSINOPHIL NFR BLD AUTO: 4.7 % (ref 0.3–6.2)
ERYTHROCYTE [DISTWIDTH] IN BLOOD BY AUTOMATED COUNT: 15 % (ref 12.3–15.4)
GLOBULIN UR ELPH-MCNC: 3 GM/DL
GLUCOSE SERPL-MCNC: 98 MG/DL (ref 65–99)
HBA1C MFR BLD: <4.3 % (ref 4.8–5.6)
HCT VFR BLD AUTO: 35.2 % (ref 37.5–51)
HDLC SERPL-MCNC: 47 MG/DL (ref 40–60)
HGB BLD-MCNC: 12.3 G/DL (ref 13–17.7)
IMM GRANULOCYTES # BLD AUTO: 0.01 10*3/MM3 (ref 0–0.05)
IMM GRANULOCYTES NFR BLD AUTO: 0.2 % (ref 0–0.5)
INR PPP: 1.93 (ref 0.84–1.13)
LDLC SERPL CALC-MCNC: 98 MG/DL (ref 0–100)
LDLC/HDLC SERPL: 1.94 {RATIO}
LYMPHOCYTES # BLD AUTO: 2.04 10*3/MM3 (ref 0.7–3.1)
LYMPHOCYTES NFR BLD AUTO: 42.1 % (ref 19.6–45.3)
MCH RBC QN AUTO: 33.2 PG (ref 26.6–33)
MCHC RBC AUTO-ENTMCNC: 34.9 G/DL (ref 31.5–35.7)
MCV RBC AUTO: 94.9 FL (ref 79–97)
MONOCYTES # BLD AUTO: 0.45 10*3/MM3 (ref 0.1–0.9)
MONOCYTES NFR BLD AUTO: 9.3 % (ref 5–12)
NEUTROPHILS NFR BLD AUTO: 2.06 10*3/MM3 (ref 1.7–7)
NEUTROPHILS NFR BLD AUTO: 42.5 % (ref 42.7–76)
NRBC BLD AUTO-RTO: 0 /100 WBC (ref 0–0.2)
PLATELET # BLD AUTO: 83 10*3/MM3 (ref 140–450)
PMV BLD AUTO: 9.5 FL (ref 6–12)
POTASSIUM SERPL-SCNC: 4.1 MMOL/L (ref 3.5–5.2)
PROT ?TM UR-MCNC: 9.2 MG/DL
PROT SERPL-MCNC: 5.9 G/DL (ref 6–8.5)
PROTHROMBIN TIME: 22 SECONDS (ref 11.4–14.4)
RBC # BLD AUTO: 3.71 10*6/MM3 (ref 4.14–5.8)
SODIUM SERPL-SCNC: 144 MMOL/L (ref 136–145)
TRIGL SERPL-MCNC: 245 MG/DL (ref 0–150)
VLDLC SERPL-MCNC: 42 MG/DL (ref 5–40)
WBC NRBC COR # BLD: 4.85 10*3/MM3 (ref 3.4–10.8)

## 2022-07-25 PROCEDURE — 93458 L HRT ARTERY/VENTRICLE ANGIO: CPT | Performed by: INTERNAL MEDICINE

## 2022-07-25 PROCEDURE — 82565 ASSAY OF CREATININE: CPT

## 2022-07-25 PROCEDURE — S0260 H&P FOR SURGERY: HCPCS | Performed by: INTERNAL MEDICINE

## 2022-07-25 PROCEDURE — 25010000002 MIDAZOLAM PER 1 MG: Performed by: INTERNAL MEDICINE

## 2022-07-25 PROCEDURE — 80053 COMPREHEN METABOLIC PANEL: CPT | Performed by: NURSE PRACTITIONER

## 2022-07-25 PROCEDURE — C1769 GUIDE WIRE: HCPCS | Performed by: INTERNAL MEDICINE

## 2022-07-25 PROCEDURE — 0 IOPAMIDOL PER 1 ML: Performed by: INTERNAL MEDICINE

## 2022-07-25 PROCEDURE — 85025 COMPLETE CBC W/AUTO DIFF WBC: CPT | Performed by: NURSE PRACTITIONER

## 2022-07-25 PROCEDURE — 83036 HEMOGLOBIN GLYCOSYLATED A1C: CPT | Performed by: NURSE PRACTITIONER

## 2022-07-25 PROCEDURE — C1894 INTRO/SHEATH, NON-LASER: HCPCS | Performed by: INTERNAL MEDICINE

## 2022-07-25 PROCEDURE — 84156 ASSAY OF PROTEIN URINE: CPT | Performed by: INTERNAL MEDICINE

## 2022-07-25 PROCEDURE — 80061 LIPID PANEL: CPT | Performed by: NURSE PRACTITIONER

## 2022-07-25 PROCEDURE — 85610 PROTHROMBIN TIME: CPT | Performed by: INTERNAL MEDICINE

## 2022-07-25 PROCEDURE — 25010000002 FENTANYL CITRATE (PF) 50 MCG/ML SOLUTION: Performed by: INTERNAL MEDICINE

## 2022-07-25 PROCEDURE — 93005 ELECTROCARDIOGRAM TRACING: CPT

## 2022-07-25 PROCEDURE — 82570 ASSAY OF URINE CREATININE: CPT | Performed by: INTERNAL MEDICINE

## 2022-07-25 RX ORDER — NITROGLYCERIN 0.4 MG/1
0.4 TABLET SUBLINGUAL
Status: DISCONTINUED | OUTPATIENT
Start: 2022-07-25 | End: 2022-07-25 | Stop reason: HOSPADM

## 2022-07-25 RX ORDER — ACETAMINOPHEN 325 MG/1
650 TABLET ORAL EVERY 4 HOURS PRN
Status: DISCONTINUED | OUTPATIENT
Start: 2022-07-25 | End: 2022-07-25 | Stop reason: HOSPADM

## 2022-07-25 RX ORDER — SODIUM CHLORIDE 9 MG/ML
1-3 INJECTION, SOLUTION INTRAVENOUS CONTINUOUS
Status: DISCONTINUED | OUTPATIENT
Start: 2022-07-25 | End: 2022-07-25 | Stop reason: HOSPADM

## 2022-07-25 RX ORDER — LIDOCAINE HYDROCHLORIDE 10 MG/ML
INJECTION, SOLUTION EPIDURAL; INFILTRATION; INTRACAUDAL; PERINEURAL AS NEEDED
Status: DISCONTINUED | OUTPATIENT
Start: 2022-07-25 | End: 2022-07-25 | Stop reason: HOSPADM

## 2022-07-25 RX ORDER — FENTANYL CITRATE 50 UG/ML
INJECTION, SOLUTION INTRAMUSCULAR; INTRAVENOUS AS NEEDED
Status: DISCONTINUED | OUTPATIENT
Start: 2022-07-25 | End: 2022-07-25 | Stop reason: HOSPADM

## 2022-07-25 RX ORDER — ASPIRIN 81 MG/1
324 TABLET, CHEWABLE ORAL ONCE
Status: COMPLETED | OUTPATIENT
Start: 2022-07-25 | End: 2022-07-25

## 2022-07-25 RX ORDER — SODIUM CHLORIDE 0.9 % (FLUSH) 0.9 %
10 SYRINGE (ML) INJECTION AS NEEDED
Status: DISCONTINUED | OUTPATIENT
Start: 2022-07-25 | End: 2022-07-25 | Stop reason: HOSPADM

## 2022-07-25 RX ORDER — ASPIRIN 81 MG/1
81 TABLET ORAL DAILY
Status: DISCONTINUED | OUTPATIENT
Start: 2022-07-26 | End: 2022-07-25 | Stop reason: HOSPADM

## 2022-07-25 RX ORDER — SODIUM CHLORIDE 0.9 % (FLUSH) 0.9 %
10 SYRINGE (ML) INJECTION EVERY 12 HOURS SCHEDULED
Status: DISCONTINUED | OUTPATIENT
Start: 2022-07-25 | End: 2022-07-25 | Stop reason: HOSPADM

## 2022-07-25 RX ORDER — MIDAZOLAM HYDROCHLORIDE 1 MG/ML
INJECTION INTRAMUSCULAR; INTRAVENOUS AS NEEDED
Status: DISCONTINUED | OUTPATIENT
Start: 2022-07-25 | End: 2022-07-25 | Stop reason: HOSPADM

## 2022-07-25 RX ORDER — ROSUVASTATIN CALCIUM 10 MG/1
10 TABLET, COATED ORAL NIGHTLY
Qty: 90 TABLET | Refills: 0 | Status: SHIPPED | OUTPATIENT
Start: 2022-07-25 | End: 2022-11-18 | Stop reason: SDDI

## 2022-07-25 RX ORDER — ASPIRIN 81 MG/1
81 TABLET ORAL DAILY
Qty: 90 TABLET | Refills: 0 | Status: SHIPPED | OUTPATIENT
Start: 2022-07-25

## 2022-07-25 RX ADMIN — ASPIRIN 81 MG CHEWABLE TABLET 324 MG: 81 TABLET CHEWABLE at 07:45

## 2022-07-25 RX ADMIN — SODIUM CHLORIDE 2.41 ML/KG/HR: 9 INJECTION, SOLUTION INTRAVENOUS at 07:47

## 2022-07-25 NOTE — H&P
Pre-cardiac Catheterization History and Physical  [facility] at [facility]      Patient:  Arley Wells  :  1960  MRN: [id number]    PCP:  eDvan Villalta MD  PHONE:  [phone]    DATE: 2022  ID: Arley Wells is a 62 y.o. male      CC: dyspnea on exertion, bilateral lower extremity edema x6 months    PROBLEM LIST:   Active Hospital Problems    Diagnosis    • **Abnormal nuclear stress test      · Nuclear stress (3/2022): large anterior ischemia.  LVEF 58%     • SOB (shortness of breath)      · Echo (3/14/22): LVEF >50%.  No significant valvular abnormality     • Essential hypertension      • Target blood pressure <130/80 mmHg  • Echo (3/14/2022): LVEF >50%.  Mild left atrial enlargement.  No significant valvular abnormality.           BRIEF HPI:     Mr. Wells is a 63 y/o male referred for cardiac catheterization due to abnormal nuclear stress test.  He has been experiencing worsening edema throughout his body, most prominently in his legs.  He also has been experiencing increased shortness of breath.  He underwent echo which was unremarkable.  An echocardiogram was performed in March and showed anterior wall defect.  The patient now is planning to undergo total hip arthroplasty with Dr. Presley Quiroz.  He needs preoperative clearance.    Patient denies exertional chest pressure to suggest classic angina.      Cardiac Risk Factors: advanced age (older than 55 for men, 65 for women), family history of premature cardiovascular disease, hypertension, male gender, obesity (BMI >= 30 kg/m2) and sedentary lifestyle    Allergies:      No Known Allergies    MEDICATIONS:  Current Outpatient Medications   Medication Instructions   • atenolol (TENORMIN) 50 mg, Oral, Every Evening   • potassium chloride 10 MEQ CR tablet 10 mEq, Oral, Daily   • torsemide (DEMADEX) 20 mg, Oral, Daily       Past medical & surgical history, social and family history reviewed in the electronic medical  "record.    ROS: Pertinent positives listed in the HPI and problem list above. All others reviewed and negative.     Physical Exam:   /70 (BP Location: Left arm, Patient Position: Lying)   Pulse 65   Temp 98 °F (36.7 °C) (Tympanic)   Resp 16   Ht 182.9 cm (72\")   Wt (!) 137 kg (302 lb 7.5 oz)   SpO2 98%   BMI 41.02 kg/m²     Constitutional:    Well-appearing 62 y.o. y/o adult who appears stated age, in no acute distress   Neck:     No Jugular venous distention    Heart:    Regular rhythm and normal rate, no murmurs, rubs or gallops   Lungs:     Clear to auscultation bilaterally, no wheezes, rhales or rhonchi, nonlabored respirations   Abdomen:     Soft, nontender   Extremities:   No gross deformities, no edema, clubbing, or cyanosis.    Pulses:    Neuro:  Psych:   Radial and dorsalis pedis pulses palpable and equal bilaterally.  No gross focal deficits  Mood and behavior appropriate for situation     Barbaeu Test:  Left: Not Assessed  (oxymetric Allens) Right: Normal     Labs and Diagnostic Data:  Results from last 7 days   Lab Units 07/25/22  0733   CREATININE mg/dL 0.80     Results from last 7 days   Lab Units 07/25/22  0721   WBC 10*3/mm3 4.85   HEMOGLOBIN g/dL 12.3*   HEMATOCRIT % 35.2*   PLATELETS 10*3/mm3 83*     Lab Results   Component Value Date    AST 65 (H) 07/12/2022    ALT 43 (H) 07/12/2022                   EKG: pending  Radiology:  3/14/2022 Echo:   Interpretation Summary    Technically limited study.     1.  The left ventricle is mildly dilated but global LV systolic function is preserved with a visually estimated ejection fraction of greater than 50%.  LV wall thickness appears to be at the upper limits of normal.  Formal regional wall motion assessment cannot be performed.  Grade 1A diastolic dysfunction.  Mild left atrial enlargement.  Borderline right atrial enlargement.  RV size and function are grossly preserved.  No septal defect or intracavitary mass or thrombus.     2.  Valves are " morphologically normal with no stenotic lesions or valve associated masses or thrombi.  There is trivial MR.     3.  No pericardial or great vessel pathology.     4.  Pulmonary artery pressures cannot be calculated.    3/14/2022 Stress Test with MPS  Interpretation Summary    1.  Scintigraphic images demonstrate a large, mildly dense ischemic defect involving the anterior and septal walls.  There is a small moderately dense ischemic defect involving the apex.     2.  Preserved post-rest ejection fraction of 58% with septal and apical hypokinesis.     3.  No evidence of pharmacologically induced transient ischemic dilation or increased lung uptake of radiopharmaceutical.    Tele: NSR    IMPRESSION:  · A reportedly previously healthy 63 y/o male with BMI of 41 who has been experiencing worsening dyspnea on exertion and lower extremity edema for  6 months. He had an a relatively normal echo 3/14/22 but a stress test with MPS showing anterior, septal and apical ischemia. Presents today for LHC +/- CBI.    PLAN:  · Procedure to perform: LHC +/- CBI. Risks, benefits and alternatives to the procedure explained to the patient and he understands and wishes to proceed.     Scribed by Luis Miguel King PA-C for Dr. Surinder Kaufman MD on 7/25/2022     Electronically signed by Mike Kaufman IV, MD, 07/25/22, 8:22 AM EDT.

## 2022-07-25 NOTE — H&P
Pre-cardiac Catheterization History and Physical  [facility] at [facility]        Patient:  Arley Wells  :  1960  MRN: [id number]     PCP:  Devan Villalta MD  PHONE:  [phone]     DATE: 2022  ID: Arley Wells is a 62 y.o. male       CC: dyspnea on exertion, bilateral lower extremity edema x6 months     PROBLEM LIST:         Active Hospital Problems     Diagnosis     • **Abnormal nuclear stress test         · Nuclear stress (3/2022): large anterior ischemia.  LVEF 58%      • SOB (shortness of breath)         · Echo (3/14/22): LVEF >50%.  No significant valvular abnormality      • Essential hypertension         · Target blood pressure <130/80 mmHg  · Echo (3/14/2022): LVEF >50%.  Mild left atrial enlargement.  No significant valvular abnormality.               BRIEF HPI:      Mr. Wells is a 61 y/o male referred for cardiac catheterization due to abnormal nuclear stress test.  He has been experiencing worsening edema throughout his body, most prominently in his legs.  He also has been experiencing increased shortness of breath.  He underwent echo which was unremarkable.  An echocardiogram was performed in March and showed anterior wall defect.  The patient now is planning to undergo total hip arthroplasty with Dr. Presley Quiroz.  He needs preoperative clearance.     Patient denies exertional chest pressure to suggest classic angina.        Cardiac Risk Factors: advanced age (older than 55 for men, 65 for women), family history of premature cardiovascular disease, hypertension, male gender, obesity (BMI >= 30 kg/m2) and sedentary lifestyle     Allergies:      No Known Allergies     MEDICATIONS:  Current Outpatient Medications   Medication Instructions   • atenolol (TENORMIN) 50 mg, Oral, Every Evening   • potassium chloride 10 MEQ CR tablet 10 mEq, Oral, Daily   • torsemide (DEMADEX) 20 mg, Oral, Daily         Past medical & surgical history, social and family history  "reviewed in the electronic medical record.     ROS: Pertinent positives listed in the HPI and problem list above. All others reviewed and negative.      Physical Exam:   /70 (BP Location: Left arm, Patient Position: Lying)   Pulse 65   Temp 98 °F (36.7 °C) (Tympanic)   Resp 16   Ht 182.9 cm (72\")   Wt (!) 137 kg (302 lb 7.5 oz)   SpO2 98%   BMI 41.02 kg/m²      Constitutional:    Well-appearing 62 y.o. y/o adult who appears stated age, in no acute distress   Neck:     No Jugular venous distention    Heart:    Regular rhythm and normal rate, no murmurs, rubs or gallops   Lungs:     Clear to auscultation bilaterally, no wheezes, rhales or rhonchi, nonlabored respirations   Abdomen:     Soft, nontender   Extremities:   No gross deformities, no edema, clubbing, or cyanosis.    Pulses:     Neuro:  Psych:   Radial and dorsalis pedis pulses palpable and equal bilaterally.  No gross focal deficits  Mood and behavior appropriate for situation      Barbaeu Test:             Left: Not Assessed  (oxymetric Allens)       Right: Normal      Labs and Diagnostic Data:       Results from last 7 days   Lab Units 07/25/22  0733   CREATININE mg/dL 0.80           Results from last 7 days   Lab Units 07/25/22  0721   WBC 10*3/mm3 4.85   HEMOGLOBIN g/dL 12.3*   HEMATOCRIT % 35.2*   PLATELETS 10*3/mm3 83*            Lab Results   Component Value Date     AST 65 (H) 07/12/2022     ALT 43 (H) 07/12/2022                     EKG: pending  Radiology:  3/14/2022 Echo:   Interpretation Summary     Technically limited study.     1.  The left ventricle is mildly dilated but global LV systolic function is preserved with a visually estimated ejection fraction of greater than 50%.  LV wall thickness appears to be at the upper limits of normal.  Formal regional wall motion assessment cannot be performed.  Grade 1A diastolic dysfunction.  Mild left atrial enlargement.  Borderline right atrial enlargement.  RV size and function are grossly " preserved.  No septal defect or intracavitary mass or thrombus.     2.  Valves are morphologically normal with no stenotic lesions or valve associated masses or thrombi.  There is trivial MR.     3.  No pericardial or great vessel pathology.     4.  Pulmonary artery pressures cannot be calculated.     3/14/2022 Stress Test with MPS  Interpretation Summary     1.  Scintigraphic images demonstrate a large, mildly dense ischemic defect involving the anterior and septal walls.  There is a small moderately dense ischemic defect involving the apex.     2.  Preserved post-rest ejection fraction of 58% with septal and apical hypokinesis.     3.  No evidence of pharmacologically induced transient ischemic dilation or increased lung uptake of radiopharmaceutical.     Tele: NSR     IMPRESSION:  · A reportedly previously healthy 61 y/o male with BMI of 41 who has been experiencing worsening dyspnea on exertion and lower extremity edema for  6 months. He had an a relatively normal echo 3/14/22 but a stress test with MPS showing anterior, septal and apical ischemia. Presents today for LHC +/- CBI.     PLAN:  · Procedure to perform: LHC +/- CBI. Risks, benefits and alternatives to the procedure explained to the patient and he understands and wishes to proceed.      Scribed by Luis Miguel King PA-C for Dr. Surinder Kaufman MD on 7/25/2022      Electronically signed by Mike Kaufman IV, MD, 07/25/22, 8:22 AM EDT.

## 2022-07-27 LAB
QT INTERVAL: 468 MS
QTC INTERVAL: 463 MS

## 2022-08-12 ENCOUNTER — OFFICE VISIT (OUTPATIENT)
Dept: CARDIOLOGY | Facility: CLINIC | Age: 62
End: 2022-08-12

## 2022-08-12 DIAGNOSIS — R06.02 SOB (SHORTNESS OF BREATH): ICD-10-CM

## 2022-08-12 DIAGNOSIS — E78.2 MIXED HYPERLIPIDEMIA: ICD-10-CM

## 2022-08-12 DIAGNOSIS — R60.0 BILATERAL LEG EDEMA: Primary | ICD-10-CM

## 2022-08-12 DIAGNOSIS — I10 PRIMARY HYPERTENSION: ICD-10-CM

## 2022-08-12 DIAGNOSIS — E78.5 HYPERLIPIDEMIA LDL GOAL <70: ICD-10-CM

## 2022-08-12 PROCEDURE — 99214 OFFICE O/P EST MOD 30 MIN: CPT | Performed by: NURSE PRACTITIONER

## 2022-08-12 RX ORDER — TORSEMIDE 20 MG/1
40 TABLET ORAL DAILY
Qty: 60 TABLET | Refills: 6 | Status: SHIPPED | OUTPATIENT
Start: 2022-08-12

## 2022-08-12 NOTE — PROGRESS NOTES
Subjective     Arley Wells is a 62 y.o. male who presents to day for cath follow up .    CHIEF COMPLIANT  Chief Complaint   Patient presents with   • cath follow up        Active Problems:  Problem List Items Addressed This Visit        Cardiac and Vasculature    Hyperlipidemia LDL goal <70    Overview     • High intensity statin therapy indicated given the presence of CAD            Pulmonary and Pneumonias    SOB (shortness of breath)    Overview     · Echo (3/14/22): LVEF >50%.  No significant valvular abnormality           Other Visit Diagnoses     Bilateral leg edema    -  Primary    Relevant Medications    torsemide (DEMADEX) 20 MG tablet    Primary hypertension        Relevant Medications    torsemide (DEMADEX) 20 MG tablet    Mixed hyperlipidemia          Problem list  1.  Shortness of breath  1.1 stress test 3/22: Large, mildly dense ischemic defect involving the anterior and septal walls.  Small moderately dense ischemic defect involving the apex, EF 58%, septal and apical hypokinesis  1.2 left heart catheterization 7/22: Mild to moderate diffuse coronary artery disease, diffuse moderate coronary artery disease of the mid to distal LAD, EF 50%, normal LV filling pressures LVEDP 6  2.  Lower extremity edema,  3.  Fatigue  4.  Hypertension    HPI  HPI    Arley Wells is a 62-year-old male who presents today for a follow-up evaluation for catheter follow-up. He is accompanied by his wife.     The patient states he is concerned about his protein intake and that it is important for him to get more protein intake in daily. He reports that his wife has made sure he is getting a protein shake each day that is about 35 grams of protein as the cardiologist stated it was concerning with the patient's upcoming surgery that it is important he has more protein in his diet as well as after his surgery. The cardiologist was concerned if the lack of protein in his daily intake was causing the edema he has been  "experiencing, the patient states the cardiologist was questioning if he was eating enough protein or if the patient's kidneys were excreting too much out.     The patient's wife stated she has been rubbing aloe vera after sun and the \"brown stuff\" on the patient's feet and reports that she has seen improvement and feels they look really good, not as discolored as they were. He reports that his legs look better than this but is happy about his improvement in color and pulses in his feet despite the edema he is still experiencing. The patient's wife reports they have been getting his water pill in daily and she inquired if compression socks would be beneficial for the patient to use at this time. The patient's wife was stating the cardiologist, Dr. Kaufman, was inquiring about changing the patient's blood pressure medication, the atenolol, as he was curious if that could be contributing to the edema or cause the low protein the patient was experiencing as he has been on it for so long. The patient reports he has noticed if he took the water pill with food he will not urinate as much but if he takes it without, he notices he will urinate more; however, the patient's wife states she has been trying to get his water intake increased. The patient's wife states the patient missed his Crestor medication the other night but will make sure to get it back on track. The patient reports he feels his leg edema has improved as they are not as painful or \"tight\" like they were previously and he states he has lost 15 pounds since last visit; however, the office scales show a 6 pound weight loss from previous visit.         He denies any chest pain, shortness of breath, palpitations, fatigue, dizziness, lightheadedness, syncope, PND, orthopnea, or strokelike symptoms.            PRIOR MEDS  Current Outpatient Medications on File Prior to Visit   Medication Sig Dispense Refill   • aspirin (aspirin) 81 MG EC tablet Take 1 tablet by " mouth Daily. 90 tablet 0   • atenolol (TENORMIN) 50 MG tablet Take 50 mg by mouth Every Evening.     • potassium chloride 10 MEQ CR tablet Take 1 tablet by mouth Daily. 30 tablet 11   • rosuvastatin (CRESTOR) 10 MG tablet Take 1 tablet by mouth Every Night. 90 tablet 0     No current facility-administered medications on file prior to visit.       ALLERGIES  Patient has no known allergies.    HISTORY  Past Medical History:   Diagnosis Date   • Edema    • Hypertension        Social History     Socioeconomic History   • Marital status:    Tobacco Use   • Smoking status: Never Smoker   • Smokeless tobacco: Never Used   Substance and Sexual Activity   • Alcohol use: Never   • Drug use: Never   • Sexual activity: Defer       Family History   Problem Relation Age of Onset   • Hypertension Mother    • Hyperlipidemia Mother    • Heart attack Mother    • Stroke Mother    • No Known Problems Father    • Heart attack Maternal Grandmother    • Heart attack Maternal Grandfather        Review of Systems   Constitutional: Negative for chills, fatigue and fever.   HENT: Negative for congestion, rhinorrhea and sore throat.    Respiratory: Negative for chest tightness and shortness of breath.    Cardiovascular: Positive for leg swelling (feet). Negative for chest pain and palpitations.   Gastrointestinal: Positive for constipation. Negative for diarrhea and nausea.   Musculoskeletal: Positive for arthralgias. Negative for back pain and neck pain.   Allergic/Immunologic: Positive for environmental allergies. Negative for food allergies.   Neurological: Negative for dizziness, syncope, weakness and light-headedness.   Hematological: Bruises/bleeds easily (bleeds easy).   Psychiatric/Behavioral: Negative for sleep disturbance.       Objective     VITALS: There were no vitals taken for this visit.    LABS:   Lab Results (most recent)     None          IMAGING:   Cardiac Catheterization/Vascular Study    Addendum Date: 7/25/2022     · Mild to moderate diffuse coronary artery disease · The anterior wall abnormality noted on nuclear stress testing likely represents ischemia due to diffuse moderate CAD of the mid to distal LAD. · No left ventriculography performed. However, an echocardiogram performed 3/14/2022 demonstrated LVEF >50% · Normal LV filling pressure (LVEDP 6)        EXAM:  Physical Exam  Vitals and nursing note reviewed.   Constitutional:       Appearance: He is well-developed.   HENT:      Head: Normocephalic.   Eyes:      Pupils: Pupils are equal, round, and reactive to light.   Neck:      Thyroid: No thyroid mass.      Vascular: No carotid bruit or JVD.      Trachea: Trachea and phonation normal.   Cardiovascular:      Rate and Rhythm: Normal rate and regular rhythm.      Pulses:           Radial pulses are 2+ on the right side and 2+ on the left side.        Posterior tibial pulses are 2+ on the right side and 2+ on the left side.      Heart sounds: Murmur heard.     No friction rub. No gallop.   Pulmonary:      Effort: Pulmonary effort is normal. No respiratory distress.      Breath sounds: Normal breath sounds. No wheezing or rales.   Abdominal:      General: Bowel sounds are normal.      Palpations: Abdomen is soft.   Musculoskeletal:         General: Swelling (3+ BLE) present. Normal range of motion.      Cervical back: Neck supple.   Skin:     General: Skin is warm and dry.      Capillary Refill: Capillary refill takes less than 2 seconds.      Findings: No rash.   Neurological:      Mental Status: He is alert and oriented to person, place, and time.   Psychiatric:         Speech: Speech normal.         Behavior: Behavior normal.         Thought Content: Thought content normal.         Judgment: Judgment normal.         Procedure   Procedures        1. A heart catheter was performed and it showed 30 percent in the RCA, 40 percent in the obtuse margin, which is a side branch of the circumflex. The LAD can be seen and showed  moderate diffuse disease throughout the LAD where the stress test cam back positive on the anterior, front part of the hear. The heart pump function is well preserved at 58 percent and a heart stent or balloon was not needed. The cardiologist was concerned there was a moderate disease in the left anterior descending,  that could have made the stress test positive, but with all the swelling, it could be hypoalbuminemia.   The echocardiogram showed a grade 1 diastolic dysfunction which is the resting pressures of the heart when in rest and it was not extremely elevated.     2. The patient was advised to increase his daily intake of protein in his diet by eating more meats, cheeses, eggs, and other enriched foods for protein. The patient will continue to take the protein supplements and shake each day. The patient was advised to continue with all his current medication; however, the torsemide 20 mg will be increased to another 20 mg when the patient feels his edema is worse or has gained more than 3 pounds over night. The patient was advised that the upcoming surgery would be safe to continue with as long as protein keeps increasing and it was suggested to stop the aspirin 5 days before the surgery to have less risk of bleeding; however, he was advised to continue with the aspirin after the surgery.     3.  Informed of signs and symptoms of ACS and advised to seek emergent treatment for any new worsening symptoms.  Patient also advised sooner follow-up as needed.  Also advised to follow-up with family doctor as needed  This note is dictated utilizing voice recognition software.  Although this record has been proof read, transcriptional errors may still be present. If questions occur regarding the content of this record please do not hesitate to call our office.  I have reviewed and confirmed the accuracy of the ROS as documented by the MA/LPN/RN JESSICA Quesada      Assessment & Plan    Diagnosis Plan   1.  Bilateral leg edema  torsemide (DEMADEX) 20 MG tablet   2. SOB (shortness of breath)     3. Primary hypertension     4. Hyperlipidemia LDL goal <70     5. Mixed hyperlipidemia         Return in about 3 months (around 11/12/2022), or if symptoms worsen or fail to improve.    Diagnoses and all orders for this visit:    1. Bilateral leg edema (Primary)  -     torsemide (DEMADEX) 20 MG tablet; Take 2 tablets by mouth Daily.  Dispense: 60 tablet; Refill: 6    2. SOB (shortness of breath)    3. Primary hypertension    4. Hyperlipidemia LDL goal <70    5. Mixed hyperlipidemia        Arley Wells  reports that he has never smoked. He has never used smokeless tobacco.. I have educated him on the risk of diseases from using tobacco products.     Transcribed from ambient dictation for JESSICA Quesada by Britney Roche.  08/12/22   14:40 EDT    Patient verbalized consent to the visit recording.  I have personally performed the services described in this document as transcribed by the above individual, and it is both accurate and complete.  JESSICA Quesada  8/14/2022  22:35 EDT             MEDS ORDERED DURING VISIT:  New Medications Ordered This Visit   Medications   • torsemide (DEMADEX) 20 MG tablet     Sig: Take 2 tablets by mouth Daily.     Dispense:  60 tablet     Refill:  6           This document has been electronically signed by JESSICA Quesada Jr.  August 14, 2022 22:33 EDT

## 2022-11-18 ENCOUNTER — OFFICE VISIT (OUTPATIENT)
Dept: CARDIOLOGY | Facility: CLINIC | Age: 62
End: 2022-11-18

## 2022-11-18 VITALS
DIASTOLIC BLOOD PRESSURE: 68 MMHG | WEIGHT: 282 LBS | HEART RATE: 54 BPM | HEIGHT: 72 IN | OXYGEN SATURATION: 99 % | SYSTOLIC BLOOD PRESSURE: 144 MMHG | BODY MASS INDEX: 38.19 KG/M2

## 2022-11-18 DIAGNOSIS — R06.09 DYSPNEA ON EXERTION: Primary | ICD-10-CM

## 2022-11-18 DIAGNOSIS — R60.0 BILATERAL LEG EDEMA: ICD-10-CM

## 2022-11-18 PROCEDURE — 99214 OFFICE O/P EST MOD 30 MIN: CPT | Performed by: NURSE PRACTITIONER

## 2022-11-18 RX ORDER — METOLAZONE 5 MG/1
5 TABLET ORAL DAILY PRN
Qty: 15 TABLET | Refills: 0 | Status: SHIPPED | OUTPATIENT
Start: 2022-11-18

## 2022-11-18 NOTE — PROGRESS NOTES
Subjective     Arley Wells is a 62 y.o. male who presents to day for 3 month follow up , Shortness of Breath, Edema (bilateral), and Hyperlipidemia.    CHIEF COMPLIANT  Chief Complaint   Patient presents with   • 3 month follow up    • Shortness of Breath   • Edema     bilateral   • Hyperlipidemia       Active Problems:  Problem List Items Addressed This Visit    None  Problem list  1.  Shortness of breath  1.1 stress test 3/22: Large, mildly dense ischemic defect involving the anterior and septal walls.  Small moderately dense ischemic defect involving the apex, EF 58%, septal and apical hypokinesis  1.2 left heart catheterization 7/22: Mild to moderate diffuse coronary artery disease, diffuse moderate coronary artery disease of the mid to distal LAD, EF 50%, normal LV filling pressures LVEDP 6  2.  Lower extremity edema,  3.  Fatigue  4.  Hypertension    HPI  HPI    Arley Wells is a 62-year-old male patient being followed up today for hypertension and lower extremity edema. The patient does have chronic arterial hypertension in which he is on atenolol 50 MG daily. Today, his blood pressures 144/68 mmHg, heart rate of 54 BPM. He previously was on rosuvastatin but has discontinued on his own. He is on antiplatelet therapy of aspirin 81 MG daily and torsemide 40 MG daily for his lower extremity edema, which he reports that it has improved and now is only mild. He is accompanied by an adult female to this visit.     The female present states that he went to see Dr. Villalta for his lower extremity edema. His legs were leaking fluid and he had to use the brown wraps. Once he started using the brown wraps his legs healed up, and the leaking stopped. Today, he is experiencing lower extremity edema. He has been wearing loose diabetic socks.     He has canceled his hip surgery. After discussing with Dr. Sauer that he was high risk due to his weight. He is currently trying to lose weight. Also, he was told that he  that the fluid from his lower extremity edema could cause infection. He also decided against being prescribed narcotic pain medication. The female present states he has lost weight.     Recently, the patient had influenza. The female present states he slept 4 days. He states he was extremely fatigued.     He denies any chest pain, shortness of breath, palpitations, fatigue, dizziness, lightheadedness, syncope, PND, orthopnea, or strokelike symptoms.          PRIOR MEDS  Current Outpatient Medications on File Prior to Visit   Medication Sig Dispense Refill   • aspirin (aspirin) 81 MG EC tablet Take 1 tablet by mouth Daily. 90 tablet 0   • atenolol (TENORMIN) 50 MG tablet Take 50 mg by mouth Every Evening.     • potassium chloride 10 MEQ CR tablet Take 1 tablet by mouth Daily. 30 tablet 11   • torsemide (DEMADEX) 20 MG tablet Take 2 tablets by mouth Daily. 60 tablet 6   • rosuvastatin (CRESTOR) 10 MG tablet Take 1 tablet by mouth Every Night. 90 tablet 0     No current facility-administered medications on file prior to visit.       ALLERGIES  Patient has no known allergies.    HISTORY  Past Medical History:   Diagnosis Date   • Edema    • Hypertension        Social History     Socioeconomic History   • Marital status:    Tobacco Use   • Smoking status: Never   • Smokeless tobacco: Never   Substance and Sexual Activity   • Alcohol use: Never   • Drug use: Never   • Sexual activity: Defer       Family History   Problem Relation Age of Onset   • Hypertension Mother    • Hyperlipidemia Mother    • Heart attack Mother    • Stroke Mother    • No Known Problems Father    • Heart attack Maternal Grandmother    • Heart attack Maternal Grandfather        Review of Systems   Constitutional: Negative for chills, fatigue and fever.   HENT: Negative for congestion, rhinorrhea and sore throat.    Respiratory: Negative for chest tightness and shortness of breath.    Cardiovascular: Positive for leg swelling (has improved from  "last visit is mild). Negative for chest pain and palpitations.   Gastrointestinal: Negative for constipation, diarrhea and nausea.   Musculoskeletal: Positive for arthralgias and back pain. Negative for neck pain.   Allergic/Immunologic: Positive for environmental allergies. Negative for food allergies.   Neurological: Negative for dizziness, syncope, weakness and light-headedness.   Hematological: Bruises/bleeds easily (bleeds easily).   Psychiatric/Behavioral: Negative for sleep disturbance.       Objective     VITALS: /68 (BP Location: Left arm, Patient Position: Sitting)   Pulse 54   Ht 182.9 cm (72.01\")   Wt 128 kg (282 lb)   SpO2 99%   BMI 38.24 kg/m²     LABS:   Lab Results (most recent)     None          IMAGING:   Cardiac Catheterization/Vascular Study    Addendum Date: 7/25/2022    · Mild to moderate diffuse coronary artery disease · The anterior wall abnormality noted on nuclear stress testing likely represents ischemia due to diffuse moderate CAD of the mid to distal LAD. · No left ventriculography performed. However, an echocardiogram performed 3/14/2022 demonstrated LVEF >50% · Normal LV filling pressure (LVEDP 6)        EXAM:  Physical Exam  Vitals and nursing note reviewed.   Constitutional:       Appearance: He is well-developed.   HENT:      Head: Normocephalic and atraumatic.   Eyes:      Pupils: Pupils are equal, round, and reactive to light.   Neck:      Vascular: No carotid bruit or JVD.   Cardiovascular:      Rate and Rhythm: Normal rate and regular rhythm.      Pulses:           Carotid pulses are 2+ on the right side and 2+ on the left side.       Radial pulses are 2+ on the right side and 2+ on the left side.        Posterior tibial pulses are 2+ on the right side and 2+ on the left side.      Heart sounds: Murmur heard.     No gallop.   Pulmonary:      Effort: Pulmonary effort is normal. No respiratory distress.      Breath sounds: Normal breath sounds.   Abdominal:      " General: Bowel sounds are normal. There is no distension.      Palpations: Abdomen is soft.      Tenderness: There is no abdominal tenderness.   Musculoskeletal:         General: Swelling (3+ ble) present. Normal range of motion.      Cervical back: Neck supple.   Skin:     General: Skin is warm and dry.   Neurological:      Mental Status: He is alert and oriented to person, place, and time.      Cranial Nerves: No cranial nerve deficit.      Sensory: No sensory deficit.   Psychiatric:         Speech: Speech normal.         Behavior: Behavior normal.         Thought Content: Thought content normal.         Judgment: Judgment normal.         Procedure   Procedures       Assessment & Plan    Diagnosis Plan   1. Dyspnea on exertion  metOLazone (ZAROXOLYN) 5 MG tablet      2. Bilateral leg edema  metOLazone (ZAROXOLYN) 5 MG tablet        1. The patient has been experiencing lower extremity edema with episodes of weeping. He was prescribed metolazone to take for the next 3 days. He was advised that if he experience increased edema, he can take metolazone for 3 days again as needed. He was advised to take the metolazone 30 minutes prior to torsemide.   2. The patient was advised to continue taking his cardiac medication regimen as prescribed.  3.  Patient's blood pressure is controlled on current blood pressure medication regimen despite elevated blood pressure today.  No medication changes are warranted at this time.  Patient advised to monitor blood pressure on a daily basis and report any persistent highs or lows.  Set goal blood pressure for patient at 130/80 or below.  4.  Informed of signs and symptoms of ACS and advised to seek emergent treatment for any new worsening symptoms.  Patient also advised sooner follow-up as needed.  Also advised to follow-up with family doctor as needed  This note is dictated utilizing voice recognition software.  Although this record has been proof read, transcriptional errors may  still be present. If questions occur regarding the content of this record please do not hesitate to call our office.  I have reviewed and confirmed the accuracy of the ROS as documented by the MA/LPN/RN JESSICA Quesada    Return in about 3 months (around 2/18/2023), or if symptoms worsen or fail to improve.    Diagnoses and all orders for this visit:    1. Dyspnea on exertion (Primary)  -     metOLazone (ZAROXOLYN) 5 MG tablet; Take 1 tablet by mouth Daily As Needed (swelling).  Dispense: 15 tablet; Refill: 0    2. Bilateral leg edema  -     metOLazone (ZAROXOLYN) 5 MG tablet; Take 1 tablet by mouth Daily As Needed (swelling).  Dispense: 15 tablet; Refill: 0        Arley Wells  reports that he has never smoked. He has never used smokeless tobacco.. I have educated him on the risk of diseases from using tobacco products .                  MEDS ORDERED DURING VISIT:  No orders of the defined types were placed in this encounter.          This document has been electronically signed by JESSICA Quesada Jr.  November 18, 2022 08:56 EST      Transcribed from ambient dictation for JESSICA Quesada by Ashly Meeks [employer] .  11/18/22   11:39 EST    Patient or patient representative verbalized consent to the visit recording.  I have personally performed the services described in this document as transcribed by the above individual, and it is both accurate and complete.

## 2023-02-21 RX ORDER — CEPHALEXIN 500 MG/1
500 CAPSULE ORAL 2 TIMES DAILY
Qty: 20 CAPSULE | Refills: 0 | Status: SHIPPED | OUTPATIENT
Start: 2023-02-21

## (undated) DEVICE — NDL ANGIOGR ADV THN SMOTH SGLWALL 21G 1.5

## (undated) DEVICE — DEV COMP RAD PRELUDESYNC 24CM

## (undated) DEVICE — GW PERIPH GUIDERIGHT STD/EXCHNG/J/TIP SS 0.035IN 5X260CM

## (undated) DEVICE — CATH DIAG EXPO .056 FL3.5 6F 100CM

## (undated) DEVICE — CATH DIAG EXPO M/ PK 6FR FL4/FR4 PIG 3PK

## (undated) DEVICE — DEV COMP RAD PRELUDESYNC 29CM

## (undated) DEVICE — PK CATH CARD 10

## (undated) DEVICE — GLIDESHEATH BASIC HYDROPHILIC COATED INTRODUCER SHEATH: Brand: GLIDESHEATH

## (undated) DEVICE — KT MANIFOLD CATHLAB CUST

## (undated) DEVICE — CVR PROB ULTRASND/TRANSD W/GEL 7X11IN STRL